# Patient Record
Sex: FEMALE | Race: WHITE | NOT HISPANIC OR LATINO | Employment: OTHER | URBAN - METROPOLITAN AREA
[De-identification: names, ages, dates, MRNs, and addresses within clinical notes are randomized per-mention and may not be internally consistent; named-entity substitution may affect disease eponyms.]

---

## 2024-08-09 ENCOUNTER — HOSPITAL ENCOUNTER (OUTPATIENT)
Dept: RADIOLOGY | Facility: HOSPITAL | Age: 79
Discharge: HOME | End: 2024-08-09
Payer: MEDICARE

## 2024-08-09 DIAGNOSIS — N20.0 CALCULUS OF KIDNEY: ICD-10-CM

## 2024-08-09 PROCEDURE — 74018 RADEX ABDOMEN 1 VIEW: CPT

## 2024-08-15 ENCOUNTER — APPOINTMENT (OUTPATIENT)
Dept: UROLOGY | Facility: CLINIC | Age: 79
End: 2024-08-15
Payer: MEDICARE

## 2024-08-15 VITALS
BODY MASS INDEX: 24.92 KG/M2 | HEART RATE: 99 BPM | SYSTOLIC BLOOD PRESSURE: 156 MMHG | WEIGHT: 146 LBS | DIASTOLIC BLOOD PRESSURE: 80 MMHG | HEIGHT: 64 IN | TEMPERATURE: 98.1 F

## 2024-08-15 DIAGNOSIS — R39.9 UTI SYMPTOMS: ICD-10-CM

## 2024-08-15 DIAGNOSIS — N20.0 KIDNEY STONE: Primary | ICD-10-CM

## 2024-08-15 LAB
POC APPEARANCE, URINE: CLEAR
POC BILIRUBIN, URINE: NEGATIVE
POC BLOOD, URINE: NEGATIVE
POC COLOR, URINE: YELLOW
POC GLUCOSE, URINE: NEGATIVE MG/DL
POC KETONES, URINE: NEGATIVE MG/DL
POC LEUKOCYTES, URINE: NEGATIVE
POC NITRITE,URINE: POSITIVE
POC PH, URINE: 5.5 PH
POC PROTEIN, URINE: NEGATIVE MG/DL
POC SPECIFIC GRAVITY, URINE: 1.02
POC UROBILINOGEN, URINE: 0.2 EU/DL

## 2024-08-15 PROCEDURE — 81002 URINALYSIS NONAUTO W/O SCOPE: CPT | Performed by: UROLOGY

## 2024-08-15 PROCEDURE — 99213 OFFICE O/P EST LOW 20 MIN: CPT | Performed by: UROLOGY

## 2024-08-15 NOTE — PROGRESS NOTES
08/15/2024  No kidney stone pain, no voiding problem    KUB 8/11/2024  IMPRESSION:  1.  Stable left renal calculi, since 2023    We discussed kidney stone, kidney stone prevention stone diet etc.  We discussed the kidney stone KUB  All the questions were answered, the patient expressed understanding and agreed to the plan.    Impression  Left kidney stone, nonobstructive and asymptomatic    Plan  Stone education  Follow-up as needed     Latest Reference Range & Units 08/15/24 14:35   POC Color, Urine Straw, Yellow, Light-Yellow  Yellow   POC Specific Gravity, Urine 1.005 - 1.035  1.020   POC PH, Urine No Reference Range Established PH 5.5   POC Protein, Urine NEGATIVE, 30 (1+) mg/dl NEGATIVE   POC Glucose, Urine NEGATIVE mg/dl NEGATIVE   POC Blood, Urine NEGATIVE  NEGATIVE   POC Ketones, Urine NEGATIVE mg/dl NEGATIVE   POC Bilirubin, Urine NEGATIVE  NEGATIVE   POC Urobilinogen, Urine 0.2, 1.0 EU/DL 0.2   POC Leukocytes, Urine NEGATIVE  NEGATIVE   08/14/2023  Left kidney stone follow-up. Pain-free, no blood in the urine     Patient has no nausea, no vomiting, no fever.     Patient here for 3 month follow up for renal cyst and to go over results of KUB. KUB done 08/10/23 patient denies pain or urinary problems, no N&V or hematuria.      Large calcific density in the location of patient's known left renal  pelvis nephrolith measuring 19 mm. Additional smaller calcific  density that correlates with known interpolar region nephrolith  measuring 5 mm.     No calcific densities are visualized in the expected anatomic  location of the right kidney.     IMPRESSION:  1. Persistent left renal nephrolithiasis as described above     We discussed the left kidney stone, stable asymptomatic, KUB finding  We discussed the option lithotripsy versus conservative management  All the questions were answered, the patient expressed understanding and agreed to the plan.     Impression  Left kidney stone  Right kidney cyst      Plan  Conservative management  KUB in 1 year for left kidney stone follow-up  Appointment in 1 year  Call if pain, blood in urine etc.           05/15/2023  77-year-old female present an incidental finding of left kidney stones and a right kidney cyst on CT scan. Patient denied pain or voiding symptoms     Patient has no nausea, no vomiting, no fever.     New patient here today for concerns of bilateral renal stones.   Patient was seen in ER 1/12/23 after a fall, kidney stones were incidental finding at that time. She also was having issues with being in and out of ER due to back pain due to slipped discs, and multiple strokes as well.      She had CT chest abd/pelvis with IV contrast done 1/12/23--IMPRESSION:  Cluster of nodules measured together, 1.5 cm, in the posterior  segment of the the right upper lobe abutting the fissure.  Ground-glass atelectasis, in the inferior anterior aspect.  Additionally subpleural reticulation, in the lower lobes bilaterally.  d a nodule d 0.9 cm, in the superior segment of the left lower lobe.  Finding most consistent with post infectious, atypical viral  infection may be considered. Recommended a follow-up CT in 6-8 weeks  to ensure complete resolution of the changes.  1.7 cm stone in the left renal pelvis and 0.8 cm in the inferior pole  aleida. The left renal pelvis is dilated minimal intrarenal  hydronephrosis.  Changes of gastritis.  Suboptimal evaluation of the gallbladder due to motion.  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 Most  pronounced changes, at L2-L3 and L3-L4. Osteopenia.     Patient states she has not had any back or abdominal pain. Denies dysuria, burning, hematuria, fever, nausea, vomiting.   -JMorgenstern CMA     IO Glucose - Urine Negative REQUIRED    IO Bilirubin Negative REQUIRED    IO Ketones Negative REQUIRED    IO Specific Gravity 1.015 REQUIRED    IO Blood (+++)large - 80 REQUIRED    IO pH 5.5 REQUIRED    IO Protein, Urine  Negative REQUIRED    IO Urobilinogen Normal (0.2-1.0 mg/dl) REQUIRED    IO Nitrite, Urine Negative REQUIRED    IO Leukocytes Trace      We discussed asymptomatic left kidney stone, conservative management due to the age and comorbidity  We discussed right kidney cysts, conservative management  All the questions were answered, the patient expressed understanding and agreed to the plan.     Impression  Left kidney stone  Right kidney cyst     Plan  Conservative management  We will be in 3 months for left kidney stone follow-up  Appointment in 3 months